# Patient Record
Sex: FEMALE | Race: WHITE | URBAN - METROPOLITAN AREA
[De-identification: names, ages, dates, MRNs, and addresses within clinical notes are randomized per-mention and may not be internally consistent; named-entity substitution may affect disease eponyms.]

---

## 2024-08-22 DIAGNOSIS — T14.8XXA OTHER INJURY OF UNSPECIFIED BODY REGION, INITIAL ENCOUNTER: Primary | ICD-10-CM

## 2024-08-22 DIAGNOSIS — M54.50 LUMBAGO: ICD-10-CM

## 2024-08-22 DIAGNOSIS — I89.0 LYMPHEDEMA: ICD-10-CM

## 2024-08-22 DIAGNOSIS — R26.9 ABNORMALITY OF GAIT: ICD-10-CM

## 2024-09-11 NOTE — PROGRESS NOTES
"[facility]  Outpatient Occupational Therapy  Functional Mobility and Wheelchair Assessment    Today's Date: 9/12/2024   Name: Katharine Gutierrez "Malina"  MRN [id number]    Therapy Diagnosis:   Encounter Diagnosis   Name Primary?    Impaired mobility and ADLs Yes     Physician: Luis Marie Jr., MD    Physician Orders: Wheelchair Evaluation  Comment: "Needing Electric Wheelchair"   Medical Diagnosis from Referral: M54.50 (ICD-10-CM) - Lumbago I89.0 (ICD-10-CM) - Lymphedema R26.9 (ICD-10-CM) - Abnormality of gait  Evaluation Date: 9/12/2024  Authorization Period Expiration: 8/22/2025  Plan of Care Expiration:  Evaluation Only  Visit # / Visits authorized: 1 / 1    Time In: 1:50  Time Out: 3:10  Total Billable Time: 60 minutes    Precautions: Standard and Fall    Subjective   Date of onset: ~2017  History of current condition - Malina was referred by Dr. Marie for a functional mobility and custom wheelchair assessment due to impaired mobility.  Pt reported a decline in her mobility and increased lymphedema following knee replacement in 2017. At this time, pt is taking 2-3 steps only with assistance. Currently has barriatric transport chair she received in 2020. She is using the wheelchair within the home.     Prior Therapy: outpatient PT 3x/w following knee replacement     Medical History:   No past medical history on file.    Surgical History:   Katharine Gutierrez  has no past surgical history on file.    Medications:   Katharine currently has no medications in their medication list.    Allergies:   Review of patient's allergies indicates:  Not on File     Patient height: 5'0''  Patient weight: ~310 (last weighted 2 years ago)  Is this a progressive disease? No  Any recent weight changes or trends? Yes - varies with lymphedema   Relevant future surgeries: No  Cardio status: intact  Respiratory status: intact   Does this patient have an amputation: No     HOME ENVIRONMENT  Living environment: Pt lives with " spouse and adult daughter. [facility] with threshold to enter.   Hours without assistance: 1-2 hours at times   Home is accessible to patient: portions of the home - is looking into further accommodations being made. 30 inch doors at this time.   Storage of wheelchair: in home , extra bedroom     DME: rolling walker, transport wheelchair; bidet   Owns but does not use: cane    COMMUNITY  Transportation: van with mobility car to assist patient   Does patient sit in wheelchair during transport? No   Self-?  No  Employment and/or School - specific requirements related to mobility needs: no    COMMUNICATION   Verbal communication: Understandable  Language - primary:  English  Language - secondary: n/a   Communication provided by patient and spouse     CURRENT SEATING / MOBILITY:   Current Mobility Device: manual wheelchair - transport (Bariatric)  Age of current device (years): ~4 years  Purchased by out of pocket   Current condition of mobility base:  fair; poor for pt's size  Current seating system:  not proper     Prior Level of Function: active prior to 2017 with ongoing decline following.   Current Level of Function: Pt reported at this time, she has limited shoulder range of motion and shoulder pain which impacts her transfers and safety. Moreover, pt demo lymphedema in BLE with knee pain and overal discomfort. Pt is primarily chair and bed bound. Spouse assist her with all care. She completed baths at chair/bed level.     Pain:  Average 6/10, worst 10/10, best 3/10   Location: all major joints   Pain medication    Pt's goals: Obtain power wheelchair for independent mobility in home and community.   Caregiver goals and specific limitations that may affect care: none noted      See face-sheet for patient contact and insurance information       Objective     MOBILITY / BALANCE  Sitting Balance Standing Balance Transfers Ambulation   Good: Patient able to maintain balance without handhold support, limited postural  sway. Poor: Patient requires handhold support and moderate to maximal assistance to maintain position minimal assist for stand pivot with 2 steps with rolling walker  Not for functional mobility; able to complete 2-3 steps only at this time    Fall History:   Number of falls in last 6 months: 0  Number of near falls in last 6 months: 0 Transfer method: stand pivot with rolling walker and 1 person for safety      Ability to complete Mobility-Related Activities of Daily Living (MRADL's) with CURRENT Mobility Device    Move room to room maximal assist in wheelchair- spouse to assist in transport chair  MRADL's Comments: Pt requiring total care from family at this time    Meal preparation Dependent on spouse/daughter at this time     Feeding Independent with added time/modification as needed for range     Bathing maximal assist at chair/bed level     Grooming moderate assist    Upper Extremity Dressing moderate assist    Lower Extremity Dressing total assist    Toileting maximal assist for transfers     Bowel Management: continent   Bladder Management: continent     Current Functional Mobility Equipment Skills     Cane / Crutches Not applicable   Walker / Rollator Not applicable   Manual Wheelchair - Does not meet mobility needs due to: inability to safely self propel    Manual Wheelchair with Power Assist () Unable to utilize   Scooter Unable to utilize    Power Wheelchair: standard joystick Meets needs for safe Independent functional ambulation / mobility   Power Wheelchair: alternative controls Unable to utilize    Summary: least costly alternative for independent functional mobility was found to be: power wheelchair     Functional Processing Skills for Wheeled Mobility        Processing skills are adequate for safe mobility: Yes  Patient is willing and motivated to use recommended mobility equipment: Yes  Patient is UNABLE to safely operate mobility equipment independently and requires dependent care  mobility: No       PATIENT MEASUREMENTS (inches)    1 29    26 to occiput seat to top of head    2 Right: 21  Left: 20.5 seat to shoulder left and right    3 Right: 15  Left: 14 seat to axilla left and right    4 Right: 9  Left: 8 seat to 90 degree elbow left and right    5 Right: 20.5:   Left: 20   seat depth    6 Right: 11.5  Left: 11.5 foot length left and right    7 6.5 head width    8 21 shoulder width    9 18 chest width    10 23 hip width    11 16 floor to seat left    12 16 floor to seat right   Comments: completed by [employer] while pt seated edge of mat         SENSATION and SKIN ISSUES    Sensation: impaired/Dulled Location(s) of sensation impairment: thighs/legs    Pressure Relief Methods: lean side to side to offload (without risk of falling) Effective pressure relief method(s) above can be performed consistently throughout the day: No , difficult to shift weight in current seated device and with less strength in bilateral upper extremity  to complete      Skin Issues/Skin Integrity - Current skin issues:  red area and open area back of thighs and buttock          History of skin issues:   Yes - skin break down and wounds for backside and BLEs   History of skin flap surgeries:   No   PAIN  Yes, see above with pain    How does pain interfere with mobility and/or MRADLs? yes       Gerber Scale for Predicting Pressure Sore Risk   Daisy Mayes & Coco Altamirano, Copyrighted 1988.  Risk Factor Score / Description   Sensory Perception; ability to respond meaningfully to pressure-related discomfort 3. Slightly Limited; Responds to verbal commands but cannot always communicate discomfort or need to be turned, OR has some sensory impairment which limits ability to feel pain or discomfort in 1 or 2 extremities.    Moisture; degree to which skin is exposed to moisture 2. Often Moist; skin is often but not always moist. Linen must be changed at least once a shift.    Activity; degree of physical activity 2. Chairfast;  ability to walk severely limited or nonexistent. Cannot bear own weight and/or must be assisted into chair or wheelchair.    Mobility; ability to change and control body position 2. Very Limited; makes occasional slight changes in body or extremity position but unable to make frequent or significant changes independently.    Nutrition; Usual food intake pattern  1NPO: nothing by mouth  2IV: Intravenously  3TPN: total parenteral nutrition 3. Adequate; Eats over half of most meals. Eats a total of 4 servings of protien (meat, dairy products) each day. Occasionally refuses a meal but will usually take a supplement if offered, OR is on a tube feeding or TPN3 regimen, which probably meets most of nutritional needs   Friction and Shear 1. Problem; requires moderate to maximum assistance in moving. Complete lifting without sliding against sheets is impossible. Frequently slides down in bed or chair, requiring frequent repositioning with maximum assistance. Spasticity, contractures or agitation leads to almost constant friction.    Total: 13  Very High Risk 6-9  High Risk 10-12  Moderate Risk 13-14  Mild Risk 15-18  No Risk 19-23         MAT EVALUATION    Neuro-Muscular Status (tone, reflexive, responses, etc.): Intact normal; lower tone and lymphedema in BLE      Pelvis     posterior; flexible - self correction       WFL       WFL      Tonal Influence at Pelvis: Normal   Trunk     WFL       WFL        neutral; slight elevation on right        Tonal Influence at Trunk: Normal   Head & Neck functional Good head control Tone/Movement of head and neck comments: n/a      Hips     abducted; flexible - external correction - due to lymphedema         neutral   Tone/Movement of lower extremities:  Normal  Edema: lymphedema   Location: yes         Lower Extremity Passive Range of Motion     Range of motion   Hip Flexion limited as follows: right side limitations due to weight of leg      Hip Extension limited as follows: limitations  due to weight of leg    Hip Abduction limited as follows: limitations due to weight of leg    Ankle Dorsiflexion limited as follows: due to severe edema    Ankle Plantarflexion        Lower Extremity Strength  Right lower extremity   Left lower extremity    Hip flexion: 3+/5 Hip flexion: 4/5   Hip extension:  3+/5 Hip extension: 3+/5   Hip abduction: 3+/5 Hip abduction: 3+/5   Hip adduction: 3+/5 Hip adduction 3+/5   Knee extension: 3+/5 Knee extension: 4/5   Knee flexion: 4-/5 Knee flexion: 3+/5   Ankle dorsiflexion: 3+/5 Ankle dorsiflexion: 3+/5   Ankle plantarflexion: 3+/5 Ankle plantarflexion: 3+/5         Upper Extremity Shoulder Posture:  Rounded shoulders    Tone/Movement of upper extremities:   Normal    Edema: mild-moderate for bilateral upper extremities         Wrist & Hand Handedness: right   Hand Limitations:  N/a       Upper Extremity Range of Motion     Range of Motion   Shoulder Flexion limited as follows: 80 *   Shoulder Abduction limited as follows: 45 *   Shoulder Adduction  limited as follows: 50*   Elbow Flexion WFLs   Elbow Extension WFLs   Wrist Flexion   WFLs   Wrist Extension WFLs    Strength Right: 30.8  lbs  Left: 26.4 lbs      Upper Extremity Strength  Right upper extremity   Left upper extremity     Shoulder flexion: 3-/5 Shoulder flexion: 3+/5   Shoulder Abduction: 3-/5 Shoulder Abduction: 3+/5   Elbow flexion: 4/5 Elbow flexion: 4/5   Elbow extension: 4/5 Elbow extension: 4/5   Wrist flexion: 4/5 Wrist flexion: 4/5   Wrist extension: 4/5 Wrist extension: 4/5       Mobility Base Recommendations and Justification    Mobility Base Recommendation: Power mobility base  Justification for decision: is not a safe, functional ambulator and equipment is now a lifetime medical need  Number of Hours per day in above selected mobility base: 8+    Component Recommendations and Justification  Should include but not be limited to:   POWER MOBILITY    [] Scooter/POV  [] can safely operate   [] can  safely transfer   [] has adequate trunk stability   [] functionally propel manual wheelchair    [x] Power mobility base  [x] non-ambulatory   [x] cannot functionally propel manual wheelchair   [x] cannot functionally and safely operate scooter/POV   [x] can safely operate power wheelchair   [] home is accessible   [] willing to use power wheelchair    Tilt   [x] Powered tilt on powered chair   [] Powered tilt on manual chair   [] Manual tilt on manual chair   Comments:  [x] change position for pressure relief/cannot weight shift  [x] change position against gravitational force on head and shoulders   [x] decrease pain   [] blood pressure management   [] control autonomic dysreflexia   [] decrease respiratory distress   [] management of spasticity   [] management of low tone   [] facilitate postural control   [x] rest periods   [x] control edema   [x] increase sitting tolerance   [x] aid with transfers    Recline   [] Power recline on power chair   [] Manual recline on manual chair   Comments:  [] intermittent catheterization   [] manage spasticity   [] accommodate femur to back angle   [] change position for pressure relief/cannot weight shift   [] high risk of pressure sore development   [] tilt alone does not accomplish effective pressure relief   [] difficult to transfer to and from bed  [] rest periods and sleeping in chair   [] repositioning for transfers   [] bring to full recline for ADL care   [] clothing/diaper changes in chair   [] gravity PEG tube feeding   [] head positioning   [] decrease pain   [] blood pressure management   [] control autonomic dysreflexia   [] decrease respiratory distress   [] user on ventilator    Elevator on mobility base   [] Power wheelchair   [] Scooter  [] increase Indep in transfers   [] increase Indep in ADLs   [] bathroom function and safety   [] kitchen/cooking function and safety   [] shopping   [] raise height for communication at standing level   [] raise height for eye  contact which reduces cervical neck strain and pain   [] drive at raised height for safety and navigating crowds    [] Vertical position system (anterior tilt) (Drive locks-out)   [] Stand (Drive enabled)  [] independent weight bearing   [] decrease joint contractures   [] decrease/manage spasticity   [] decrease/manage spasms   [] pressure distribution away from scapula, sacrum, coccyx, and ischial tuberosity   [] increase digestion and elimination   [] access to counters and cabinets   [] increase reach   [] increase interaction with others at eye level, reduces neck strain   [] increase performance of MRADL(s)    Power elevating legrest   [x] Center mount (Single)  degrees  [] Standard (Pair) 100-170 degrees [] position legs at 90 degrees, not available with std power ELR   [] center mount tucks into chair to decrease turning radius in home, not available with std power ELR   [x] provide change in position for LE   [x] elevate legs during recline   [x] maintain placement of feet on footplate  [x] decrease edema   [x] improve circulation   [] actuator needed to elevate legrest   [] actuator needed to articulate legrest preventing knees from flexing   [] Increase ground clearance over curbs  [] STD (pair) independently elevate legrest   POWER WHEELCHAIR CONTROLS    Controls/input device   [x] Right Hand  [] Left Hand  [] Expandable   [] Non-expandable   [x] Proportional   [] Non-proportional/switches/ head-array   [] Electrical/proximity  [] Mechanical    [x] provides access for controlling wheelchair   [] programming for accurate control   [] progressive disease/changing condition   [] required for alternative drive controls   [] lacks motor control to operate proportional drive control   [] unable to understand proportional controls   [] limited movement/strength   [] extraneous movement / tremors / ataxic / spastic   [x] Upgraded electronics controller/harness   [x] Single power (tilt or recline)   []  Expandable   [] Non-expandable plus   [] Multi-power (tilt, recline, power legrest, power seat lift, vertical positioning system, stand)  [x] allows input device to communicate with drive motors   [] harness provides necessary connections between the controller, input device, and seat functions   [] needed in order to operate power seat functions through joystick/ input device   [] required for alternative drive controls    [] Enhanced display [] required to connect all alternative drive controls   [] required for upgraded joystick (lite-throw, heavy duty, micro)  [] Allows user to see in which mode and drive the wheelchair is set; necessary for alternate controls    [] Upgraded tracking electronics [] correct tracking when on uneven surfaces   [] makes switch driving more efficient and less fatiguing   [] increase safety when driving  [] increase ability to traverse thresholds    [] Safety / reset / mode switches   Type:  [] Used to change modes and stop the wheelchair when driving    [x] Mount for joystick / input device/switches  [x] swing away for access or transfers  [] attaches joystick / input device / switches to wheelchair  [] provides for consistent access  [] midline for optimal placement   [] Attendant controlled joystick plus mount  [] safety   [] long distance driving   [] operation of seat functions   [] compliance with transportation regulations    [x] Battery  [x] required to power (power assist / scooter/ power wc / other):    [] Power inverter (24V to 12V) [] required for ventilator / respiratory equipment / other:       CHAIR OPTIONS MANUAL & POWER   Armrests   [x] adjustable height [] removable [] swing away[] fixed   [x] flip back [] reclining   [] full length pads [] desk [] tube arms   [] gel pads  [x] provide support with elbow at 90  [x] remove/flip back/swing away for transfers   [x] provide support and positioning of upper body   [x] allow to come closer to table top   [x] remove for  access to tables   [x] provide support for w/c tray   [] change of height/angles for variable activities   [] Elbow support / Elbow stop  [] keep elbow positioned on arm pad   [] keep arms from falling off arm pad during tilt and/or recline    Upper Extremity Support   [] Arm trough    [] Right [] Left [] Bilateral  Style:   [] swivel mount [] fixed mount   [] posterior hand support   [] ½ tray   [] full tray -joystick cut out  [] Right [] Left [] Bilateral  Style:  [] decrease gravitational pull on shoulders   [] provide support to increase UE function   [] provide hand support in natural position   [] position flaccid UE   [] decrease subluxation   [] decrease edema   [] manage spasticity   [] provide midline positioning   [] provide work surface   [] placement for AAC/Computer/EADL   Hangers/ Legrests   [x] 90 degree   [x] elevating   [x] articulating   [] swing away   [] fixed   [] lift off   [] heavy duty   [] adjustable knee angle   [x] adjustable calf panel   [] longer extension tube  [x] provide LE support   [x] maintain placement of feet on footplate  [x] accommodate lower leg length   [] accommodate to hamstring tightness   [x] enable transfers   [] provide change in position for LE's   [] elevate legs during recline   [x] decrease edema   [] durability    Foot support   [x] footplate   [] Right [] Left [x] Bilateral  [] flip up  [] depth adjustable   [x] angle adjustable  [] foot board/one piece   Width extension kit  [x]  [x] provide foot support   [x] accommodate to ankle ROM   [x] allow foot to go under wheelchair base   [x] enable transfers    [] Shoe holders  [] position foot   [] decrease / manage spasticity   [] control position of LE   [] stability   [] safety    [] Ankle strap/heel loops  [] support foot on foot support   [] decrease extraneous movement   [] provide input to heel   [] protect foot   [] Amputee adapter   [] Right [] Left [] Bilateral  Style: ___ Size: ___  [] Provide support for  stump/residual extremity   [] Transportation tie-down [] to provide crash tested tie-down brackets    [] Crutch/cane moran   [] O2 moran   [] IV   [] Ventilator tray/mount  [] stabilize accessory on wheelchair   [x] Seat cushion  [] accommodate impaired sensation   [x] decubitus ulcers present or history   [] unable to shift weight   [x] increase pressure distribution   [] prevent pelvic extension   [] stabilize/promote pelvis alignment   [] stabilize/promote femur alignment   [] accommodate obliquity   [] accommodate multiple deformity   [] incontinent/accidents   [x] low maintenance   [x] seat richard   [x] fixed   [] removable [x] attach seat platform/cushion to wheelchair frame   [] Seat wedge  [] provide increased aggressiveness of seat shape to decrease sliding down in the seat   [] accommodate ROM    [x] Cover replacement  [x] protect back or seat cushion   [] incontinent/accidents   [] Solid seat / insert  [] support cushion to prevent hammocking   [] allows attachment of cushion to mobility base    [] Lateral pelvic/thigh/hip support (Guides)  [] decrease abduction   [] accommodate pelvis   [] position upper legs   [] accommodate spasticity   [] removable for transfers     [] Lateral pelvic/thigh supports richard  [] fixed   [] swing-away   [] removable   [] richard lateral pelvic/thigh supports   [] richard lateral pelvic/thigh supports swing-away or removable for transfers   [] Medial thigh support (Pommel)  [] decrease adduction   [] accommodate ROM   [] alignment  [] remove for transfers     [] Medial thigh support richard   [] fixed   [] swing-away  [] removable   [] richard medial thigh supports  [] richard medial supports swing- away or removable for transfers   [x] Back  [x] provide posterior trunk support   [x] provide lumbar/sacral support   [x] support trunk in midline  [] provide lateral trunk support   [] accommodate or decrease tone   [] facilitate tone   [] accommodate deformity   [] custom  required off-the-shelf back support will not accommodate deformity    [x] Back richard  [x] fixed   [] removable [x] attach back rest/cushion to wheelchair frame   [] Lateral trunk supports    [] Right [] Left [] Bilateral [] decrease lateral trunk leaning   [] accommodate asymmetry   [] contour for increased contact   [] safety   [] control of tone    [] Lateral trunk supports richard  [] fixed   [] swing-away   [] removable [] richard lateral trunk supports   [] richard lateral trunk supports swing away or removable for transfers   [] Anterior chest strap, vest  [] decrease forward movement of shoulder   [] decrease forward movement of trunk   [] safety/stability   [] added abdominal support   [] trunk alignment   [] assistance with shoulder control   [] decrease shoulder elevation    [] Headrest  [] provide posterior head support   [] provide posterior neck support   [] provide lateral head support   [] provide anterior head support   [] support during tilt and recline   [] improve feeding   [] improve respiration   [] placement of switches   [] safety   [] accommodate ROM   [] accommodate tone   [] improve visual orientation    [] Headrest mounting hardware  [] fixed   [] removable   [] flip down   [] swing-away laterals/switches [] mount headrest   [] richard headrest flip down or removable for transfers  [] mount headrest swing-away laterals   [] mount switches    [] Neck Support  [] decrease neck rotation   [] decrease forward neck flexion    [x] Pelvic Positioner   [x] std hip belt   [] padded hip belt   [] dual pull hip belt   [] four point hip belt  [] stabilize tone   [x] decrease falling out of chair   [] prevent excessive extension   [] special pull angle to control rotation   [] pad for protection over lan prominence   [] promote comfort    [] Essential needs bag/pouch  [] medicines [] special food [] orthotics [] clothing changes   [] diapers [] catheter/hygiene [] ostomy supplies               The  above equipment has a life- long use expectancy. Growth and changes in medical and/or functional conditions would be the exceptions.      *This functional mobility and wheelchair assessment form has been adapted with permission from [employer].     TREATMENT     Malina participated in dynamic functional therapeutic activities to improve functional performance for 11  minutes, including:  -edu on HH OT and need for assistance for continued safety of patient and spouse with transfers and ADLs  -added time spent on wheelchair (power) needs for transportation and increased activity in the community      Assessment  Why mobility device was selected; include why a lower level device is not appropriate:   Katharine is a 74 y.o. female referred to outpatient Occupational Therapy with a medical diagnosis of Lumbago & Lymphedema for custom power wheelchair evaluation for increased safety and participation in daily roles and routines. At this time, pt is wheelchair and bed bound for participation in roles and routines. Having a power chair will increase her functional participation in daily roles and routines and decreased caregiver's strain with propelling patient. Moreover, patient would benefit from home health therapy for transfer training and safety with spouse, edu on wheelchair use and ADL training.     Patient has mobility limitation that significantly impairs safe, timely, consistent participation in one or more MRADL. Yes  A mobility assistive device will effectively improve ability to participate in or aid participation in MRADL's.  Yes   A cane or walker will provide patient the ability to safely and consistently perform MRADLs in a timely manner  No  The patient's home environment will support use of recommended mobility device. Yes  The patient has sufficient UE strength to effectively self propel a manual wheelchair for all MRADL's. No  The patient has sufficient upper extremity strength, , transfer  ability and trunk stability to safely operate a POV (scooter). No  The additional benefits of a power wheelchair will more effectively enable MRADL's in home. Yes   The patient demonstrates ability/potential ability to use recommended equipment. Yes  The patient is willing and motivated to use the recommended equipment. Yes    Pt prognosis is Fair.   Pt will benefit from skilled home health Occupational Therapy to address the deficits stated above and in the chart below, provide pt/family education, and to maximize pt's level of independence.     Plan of care discussed with patient: Yes  Pt's spiritual, cultural and educational needs considered and patient is agreeable to the plan of care and goals as stated below:     OT Medical Necessity is demonstrated by the following:    Profile and History Assessment of Occupational Performance Level of Clinical Decision Making Complexity Score   Occupational Profile:   Katharine Gutierrez is a 74 y.o. female who lives with their family. Katharine Gutierrez has difficulty with  bathing, grooming, and dressing  driving/transportation management, shopping, and housework/household chores  affecting his/her daily functional abilities. His/her main goal for therapy is to obtain a custom power chair.     Comorbidities:   See chart    Medical and Therapy History Review:   Expanded               Performance Deficits    Physical:  Joint Mobility  Joint Stability  Muscle Power/Strength  Muscle Endurance  Edema   Strength  Pinch Strength  Postural Control  Pain    Cognitive:  No Deficits    Psychosocial:    Routines  Social interactions  Community interactions     Clinical Decision Making:  moderate    Assessment Process:  Comprehensive Assessments    Modification/Need for Assistance:  Minimal-Moderate Modifications/Assistance    Intervention Selection:  Several Treatment Options       moderate  Based on PMHX, co morbidities , data from assessments and functional level of assistance required  with task and clinical presentation directly impacting function.         Goals:  No goals set; evaluation only     Plan of Care Certification    Wheelchair evaluation only     PETEY Guzmán  9/12/2024      As the evaluating therapist, I hereby attest that I have personally completed this evaluation and that I am not an employee of or working under contract to the (s) or the provider(s) of the durable medical equipment recommended in my evaluation. I further attest that I have not and will not receive remuneration of any kind from the (s) or the durable medical equipment provider(s) for the equipment I have recommended with this evaluation.     The following ATP was present and participated in this evaluation:   Luis Abdul, ATP from  Wheelchair vendor.

## 2024-09-12 ENCOUNTER — CLINICAL SUPPORT (OUTPATIENT)
Dept: REHABILITATION | Facility: HOSPITAL | Age: 74
End: 2024-09-12
Payer: MEDICARE

## 2024-09-12 DIAGNOSIS — Z74.09 IMPAIRED MOBILITY AND ADLS: Primary | ICD-10-CM

## 2024-09-12 DIAGNOSIS — Z78.9 IMPAIRED MOBILITY AND ADLS: Primary | ICD-10-CM

## 2024-09-12 PROCEDURE — 97166 OT EVAL MOD COMPLEX 45 MIN: CPT | Mod: PO

## 2024-09-12 PROCEDURE — 97530 THERAPEUTIC ACTIVITIES: CPT | Mod: PO

## 2024-09-12 NOTE — PLAN OF CARE
"[facility]  Outpatient Occupational Therapy  Functional Mobility and Wheelchair Assessment    Today's Date: 9/12/2024   Name: Katharine Gutierrez "Malina"  MRN [id number]    Therapy Diagnosis:   Encounter Diagnosis   Name Primary?    Impaired mobility and ADLs Yes     Physician: Luis Marie Jr., MD    Physician Orders: Wheelchair Evaluation  Comment: "Needing Electric Wheelchair"   Medical Diagnosis from Referral: M54.50 (ICD-10-CM) - Lumbago I89.0 (ICD-10-CM) - Lymphedema R26.9 (ICD-10-CM) - Abnormality of gait  Evaluation Date: 9/12/2024  Authorization Period Expiration: 8/22/2025  Plan of Care Expiration:  Evaluation Only  Visit # / Visits authorized: 1 / 1    Time In: 1:50  Time Out: 3:10  Total Billable Time: 60 minutes    Precautions: Standard and Fall    Subjective   Date of onset: ~2017  History of current condition - Malina was referred by Dr. Marie for a functional mobility and custom wheelchair assessment due to impaired mobility.  Pt reported a decline in her mobility and increased lymphedema following knee replacement in 2017. At this time, pt is taking 2-3 steps only with assistance. Currently has barriatric transport chair she received in 2020. She is using the wheelchair within the home.     Prior Therapy: outpatient PT 3x/w following knee replacement     Medical History:   No past medical history on file.    Surgical History:   Katharine Gutierrez  has no past surgical history on file.    Medications:   Katharine currently has no medications in their medication list.    Allergies:   Review of patient's allergies indicates:  Not on File     Patient height: 5'0''  Patient weight: ~310 (last weighted 2 years ago)  Is this a progressive disease? No  Any recent weight changes or trends? Yes - varies with lymphedema   Relevant future surgeries: No  Cardio status: intact  Respiratory status: intact   Does this patient have an amputation: No     HOME ENVIRONMENT  Living environment: Pt lives with " spouse and adult daughter. [facility] with threshold to enter.   Hours without assistance: 1-2 hours at times   Home is accessible to patient: portions of the home - is looking into further accommodations being made. 30 inch doors at this time.   Storage of wheelchair: in home , extra bedroom     DME: rolling walker, transport wheelchair; bidet   Owns but does not use: cane    COMMUNITY  Transportation: van with mobility car to assist patient   Does patient sit in wheelchair during transport? No   Self-?  No  Employment and/or School - specific requirements related to mobility needs: no    COMMUNICATION   Verbal communication: Understandable  Language - primary:  English  Language - secondary: n/a   Communication provided by patient and spouse     CURRENT SEATING / MOBILITY:   Current Mobility Device: manual wheelchair - transport (Bariatric)  Age of current device (years): ~4 years  Purchased by out of pocket   Current condition of mobility base: fair; poor for pt's size  Current seating system: not proper     Prior Level of Function: active prior to 2017 with ongoing decline following.   Current Level of Function: Pt reported at this time, she has limited shoulder range of motion and shoulder pain which impacts her transfers and safety. Moreover, pt demo lymphedema in BLE with knee pain and overal discomfort. Pt is primarily chair and bed bound. Spouse assist her with all care. She completed baths at chair/bed level.     Pain:  Average 6/10, worst 10/10, best 3/10   Location: all major joints   Pain medication    Pt's goals: Obtain power wheelchair for independent mobility in home and community.   Caregiver goals and specific limitations that may affect care: none noted      See face-sheet for patient contact and insurance information       Objective     MOBILITY / BALANCE  Sitting Balance Standing Balance Transfers Ambulation   Good: Patient able to maintain balance without handhold support, limited postural sway.  Poor: Patient requires handhold support and moderate to maximal assistance to maintain position minimal assist for stand pivot with 2 steps with rolling walker  Not for functional mobility; able to complete 2-3 steps only at this time    Fall History:   Number of falls in last 6 months: 0  Number of near falls in last 6 months: 0 Transfer method: stand pivot with rolling walker and 1 person for safety      Ability to complete Mobility-Related Activities of Daily Living (MRADL's) with CURRENT Mobility Device    Move room to room maximal assist in wheelchair- spouse to assist in transport chair  MRADL's Comments: Pt requiring total care from family at this time    Meal preparation Dependent on spouse/daughter at this time     Feeding Independent with added time/modification as needed for range     Bathing maximal assist at chair/bed level     Grooming moderate assist    Upper Extremity Dressing moderate assist    Lower Extremity Dressing total assist    Toileting maximal assist for transfers     Bowel Management: continent   Bladder Management: continent     Current Functional Mobility Equipment Skills     Cane / Crutches Not applicable   Walker / Rollator Not applicable   Manual Wheelchair - Does not meet mobility needs due to: inability to safely self propel    Manual Wheelchair with Power Assist () Unable to utilize   Scooter Unable to utilize    Power Wheelchair: standard joystick Meets needs for safe Independent functional ambulation / mobility   Power Wheelchair: alternative controls Unable to utilize    Summary: least costly alternative for independent functional mobility was found to be: power wheelchair     Functional Processing Skills for Wheeled Mobility        Processing skills are adequate for safe mobility: Yes  Patient is willing and motivated to use recommended mobility equipment: Yes  Patient is UNABLE to safely operate mobility equipment independently and requires dependent care  mobility: No       PATIENT MEASUREMENTS (inches)    1 29    26 to occiput seat to top of head    2 Right: 21  Left: 20.5 seat to shoulder left and right    3 Right: 15  Left: 14 seat to axilla left and right    4 Right: 9  Left: 8 seat to 90 degree elbow left and right    5 Right: 20.5:   Left: 20   seat depth    6 Right: 11.5  Left: 11.5 foot length left and right    7 6.5 head width    8 21 shoulder width    9 18 chest width    10 23 hip width    11 16 floor to seat left    12 16 floor to seat right   Comments: completed by [employer] while pt seated edge of mat         SENSATION and SKIN ISSUES    Sensation: impaired/Dulled Location(s) of sensation impairment: thighs/legs    Pressure Relief Methods: lean side to side to offload (without risk of falling) Effective pressure relief method(s) above can be performed consistently throughout the day: No , difficult to shift weight in current seated device and with less strength in bilateral upper extremity  to complete      Skin Issues/Skin Integrity - Current skin issues:  red area and open area back of thighs and buttock          History of skin issues:   Yes - skin break down and wounds for backside and BLEs   History of skin flap surgeries:   No   PAIN  Yes, see above with pain    How does pain interfere with mobility and/or MRADLs? yes       Gerber Scale for Predicting Pressure Sore Risk   Daisy Mayes & Coco Altamirano, Copyrighted 1988.  Risk Factor Score / Description   Sensory Perception; ability to respond meaningfully to pressure-related discomfort 3. Slightly Limited; Responds to verbal commands but cannot always communicate discomfort or need to be turned, OR has some sensory impairment which limits ability to feel pain or discomfort in 1 or 2 extremities.    Moisture; degree to which skin is exposed to moisture 2. Often Moist; skin is often but not always moist. Linen must be changed at least once a shift.    Activity; degree of physical activity 2. Chairfast;  ability to walk severely limited or nonexistent. Cannot bear own weight and/or must be assisted into chair or wheelchair.    Mobility; ability to change and control body position 2. Very Limited; makes occasional slight changes in body or extremity position but unable to make frequent or significant changes independently.    Nutrition; Usual food intake pattern  1NPO: nothing by mouth  2IV: Intravenously  3TPN: total parenteral nutrition 3. Adequate; Eats over half of most meals. Eats a total of 4 servings of protien (meat, dairy products) each day. Occasionally refuses a meal but will usually take a supplement if offered, OR is on a tube feeding or TPN3 regimen, which probably meets most of nutritional needs   Friction and Shear 1. Problem; requires moderate to maximum assistance in moving. Complete lifting without sliding against sheets is impossible. Frequently slides down in bed or chair, requiring frequent repositioning with maximum assistance. Spasticity, contractures or agitation leads to almost constant friction.    Total: 13  Very High Risk 6-9  High Risk 10-12  Moderate Risk 13-14  Mild Risk 15-18  No Risk 19-23         MAT EVALUATION    Neuro-Muscular Status (tone, reflexive, responses, etc.): Intact normal; lower tone and lymphedema in BLE      Pelvis     posterior; flexible - self correction       WFL       WFL      Tonal Influence at Pelvis: Normal   Trunk     WFL       WFL        neutral; slight elevation on right        Tonal Influence at Trunk: Normal   Head & Neck functional Good head control Tone/Movement of head and neck comments: n/a      Hips     abducted; flexible - external correction - due to lymphedema         neutral   Tone/Movement of lower extremities:  Normal  Edema: lymphedema   Location: yes         Lower Extremity Passive Range of Motion     Range of motion   Hip Flexion limited as follows: right side limitations due to weight of leg      Hip Extension limited as follows: limitations  due to weight of leg    Hip Abduction limited as follows: limitations due to weight of leg    Ankle Dorsiflexion limited as follows: due to severe edema    Ankle Plantarflexion        Lower Extremity Strength  Right lower extremity   Left lower extremity    Hip flexion: 3+/5 Hip flexion: 4/5   Hip extension:  3+/5 Hip extension: 3+/5   Hip abduction: 3+/5 Hip abduction: 3+/5   Hip adduction: 3+/5 Hip adduction 3+/5   Knee extension: 3+/5 Knee extension: 4/5   Knee flexion: 4-/5 Knee flexion: 3+/5   Ankle dorsiflexion: 3+/5 Ankle dorsiflexion: 3+/5   Ankle plantarflexion: 3+/5 Ankle plantarflexion: 3+/5         Upper Extremity Shoulder Posture:  Rounded shoulders    Tone/Movement of upper extremities:   Normal    Edema: mild-moderate for bilateral upper extremities         Wrist & Hand Handedness: right   Hand Limitations:  N/a       Upper Extremity Range of Motion     Range of Motion   Shoulder Flexion limited as follows: 80 *   Shoulder Abduction limited as follows: 45 *   Shoulder Adduction  limited as follows: 50*   Elbow Flexion WFLs   Elbow Extension WFLs   Wrist Flexion   WFLs   Wrist Extension WFLs    Strength Right: 30.8  lbs  Left: 26.4 lbs      Upper Extremity Strength  Right upper extremity   Left upper extremity     Shoulder flexion: 3-/5 Shoulder flexion: 3+/5   Shoulder Abduction: 3-/5 Shoulder Abduction: 3+/5   Elbow flexion: 4/5 Elbow flexion: 4/5   Elbow extension: 4/5 Elbow extension: 4/5   Wrist flexion: 4/5 Wrist flexion: 4/5   Wrist extension: 4/5 Wrist extension: 4/5       Mobility Base Recommendations and Justification    Mobility Base Recommendation: Power mobility base  Justification for decision: is not a safe, functional ambulator and equipment is now a lifetime medical need  Number of Hours per day in above selected mobility base: 8+    Component Recommendations and Justification  Should include but not be limited to:   POWER MOBILITY    [] Scooter/POV  [] can safely operate   [] can  safely transfer   [] has adequate trunk stability   [] functionally propel manual wheelchair    [x] Power mobility base  [x] non-ambulatory   [x] cannot functionally propel manual wheelchair   [x] cannot functionally and safely operate scooter/POV   [x] can safely operate power wheelchair   [] home is accessible   [] willing to use power wheelchair    Tilt   [x] Powered tilt on powered chair   [] Powered tilt on manual chair   [] Manual tilt on manual chair   Comments:  [x] change position for pressure relief/cannot weight shift  [x] change position against gravitational force on head and shoulders   [x] decrease pain   [] blood pressure management   [] control autonomic dysreflexia   [] decrease respiratory distress   [] management of spasticity   [] management of low tone   [] facilitate postural control   [x] rest periods   [x] control edema   [x] increase sitting tolerance   [x] aid with transfers    Recline   [] Power recline on power chair   [] Manual recline on manual chair   Comments:  [] intermittent catheterization   [] manage spasticity   [] accommodate femur to back angle   [] change position for pressure relief/cannot weight shift   [] high risk of pressure sore development   [] tilt alone does not accomplish effective pressure relief   [] difficult to transfer to and from bed  [] rest periods and sleeping in chair   [] repositioning for transfers   [] bring to full recline for ADL care   [] clothing/diaper changes in chair   [] gravity PEG tube feeding   [] head positioning   [] decrease pain   [] blood pressure management   [] control autonomic dysreflexia   [] decrease respiratory distress   [] user on ventilator    Elevator on mobility base   [] Power wheelchair   [] Scooter  [] increase Indep in transfers   [] increase Indep in ADLs   [] bathroom function and safety   [] kitchen/cooking function and safety   [] shopping   [] raise height for communication at standing level   [] raise height for eye  contact which reduces cervical neck strain and pain   [] drive at raised height for safety and navigating crowds    [] Vertical position system (anterior tilt) (Drive locks-out)   [] Stand (Drive enabled)  [] independent weight bearing   [] decrease joint contractures   [] decrease/manage spasticity   [] decrease/manage spasms   [] pressure distribution away from scapula, sacrum, coccyx, and ischial tuberosity   [] increase digestion and elimination   [] access to counters and cabinets   [] increase reach   [] increase interaction with others at eye level, reduces neck strain   [] increase performance of MRADL(s)    Power elevating legrest   [x] Center mount (Single)  degrees  [] Standard (Pair) 100-170 degrees [] position legs at 90 degrees, not available with std power ELR   [] center mount tucks into chair to decrease turning radius in home, not available with std power ELR   [x] provide change in position for LE   [x] elevate legs during recline   [x] maintain placement of feet on footplate  [x] decrease edema   [x] improve circulation   [] actuator needed to elevate legrest   [] actuator needed to articulate legrest preventing knees from flexing   [] Increase ground clearance over curbs  [] STD (pair) independently elevate legrest   POWER WHEELCHAIR CONTROLS    Controls/input device   [x] Right Hand  [] Left Hand  [] Expandable   [] Non-expandable   [x] Proportional   [] Non-proportional/switches/ head-array   [] Electrical/proximity  [] Mechanical    [x] provides access for controlling wheelchair   [] programming for accurate control   [] progressive disease/changing condition   [] required for alternative drive controls   [] lacks motor control to operate proportional drive control   [] unable to understand proportional controls   [] limited movement/strength   [] extraneous movement / tremors / ataxic / spastic   [x] Upgraded electronics controller/harness   [x] Single power (tilt or recline)   []  Expandable   [] Non-expandable plus   [] Multi-power (tilt, recline, power legrest, power seat lift, vertical positioning system, stand)  [x] allows input device to communicate with drive motors   [] harness provides necessary connections between the controller, input device, and seat functions   [] needed in order to operate power seat functions through joystick/ input device   [] required for alternative drive controls    [] Enhanced display [] required to connect all alternative drive controls   [] required for upgraded joystick (lite-throw, heavy duty, micro)  [] Allows user to see in which mode and drive the wheelchair is set; necessary for alternate controls    [] Upgraded tracking electronics [] correct tracking when on uneven surfaces   [] makes switch driving more efficient and less fatiguing   [] increase safety when driving  [] increase ability to traverse thresholds    [] Safety / reset / mode switches   Type:  [] Used to change modes and stop the wheelchair when driving    [x] Mount for joystick / input device/switches  [x] swing away for access or transfers  [] attaches joystick / input device / switches to wheelchair  [] provides for consistent access  [] midline for optimal placement   [] Attendant controlled joystick plus mount  [] safety   [] long distance driving   [] operation of seat functions   [] compliance with transportation regulations    [x] Battery  [x] required to power (power assist / scooter/ power wc / other):    [] Power inverter (24V to 12V) [] required for ventilator / respiratory equipment / other:       CHAIR OPTIONS MANUAL & POWER   Armrests   [x] adjustable height [] removable [] swing away[] fixed   [x] flip back [] reclining   [] full length pads [] desk [] tube arms   [] gel pads  [x] provide support with elbow at 90  [x] remove/flip back/swing away for transfers   [x] provide support and positioning of upper body   [x] allow to come closer to table top   [x] remove for  access to tables   [x] provide support for w/c tray   [] change of height/angles for variable activities   [] Elbow support / Elbow stop  [] keep elbow positioned on arm pad   [] keep arms from falling off arm pad during tilt and/or recline    Upper Extremity Support   [] Arm trough    [] Right [] Left [] Bilateral  Style:   [] swivel mount [] fixed mount   [] posterior hand support   [] ½ tray   [] full tray -joystick cut out  [] Right [] Left [] Bilateral  Style:  [] decrease gravitational pull on shoulders   [] provide support to increase UE function   [] provide hand support in natural position   [] position flaccid UE   [] decrease subluxation   [] decrease edema   [] manage spasticity   [] provide midline positioning   [] provide work surface   [] placement for AAC/Computer/EADL   Hangers/ Legrests   [x] 90 degree   [x] elevating   [x] articulating   [] swing away   [] fixed   [] lift off   [] heavy duty   [] adjustable knee angle   [x] adjustable calf panel   [] longer extension tube  [x] provide LE support   [x] maintain placement of feet on footplate  [x] accommodate lower leg length   [] accommodate to hamstring tightness   [x] enable transfers   [] provide change in position for LE's   [] elevate legs during recline   [x] decrease edema   [] durability    Foot support   [x] footplate   [] Right [] Left [x] Bilateral  [] flip up  [] depth adjustable   [x] angle adjustable  [] foot board/one piece   Width extension kit  [x]  [x] provide foot support   [x] accommodate to ankle ROM   [x] allow foot to go under wheelchair base   [x] enable transfers    [] Shoe holders  [] position foot   [] decrease / manage spasticity   [] control position of LE   [] stability   [] safety    [] Ankle strap/heel loops  [] support foot on foot support   [] decrease extraneous movement   [] provide input to heel   [] protect foot   [] Amputee adapter   [] Right [] Left [] Bilateral  Style: ___ Size: ___  [] Provide support for  stump/residual extremity   [] Transportation tie-down [] to provide crash tested tie-down brackets    [] Crutch/cane moran   [] O2 moran   [] IV   [] Ventilator tray/mount  [] stabilize accessory on wheelchair   [x] Seat cushion  [] accommodate impaired sensation   [x] decubitus ulcers present or history   [] unable to shift weight   [x] increase pressure distribution   [] prevent pelvic extension   [] stabilize/promote pelvis alignment   [] stabilize/promote femur alignment   [] accommodate obliquity   [] accommodate multiple deformity   [] incontinent/accidents   [x] low maintenance   [x] seat richard   [x] fixed   [] removable [x] attach seat platform/cushion to wheelchair frame   [] Seat wedge  [] provide increased aggressiveness of seat shape to decrease sliding down in the seat   [] accommodate ROM    [x] Cover replacement  [x] protect back or seat cushion   [] incontinent/accidents   [] Solid seat / insert  [] support cushion to prevent hammocking   [] allows attachment of cushion to mobility base    [] Lateral pelvic/thigh/hip support (Guides)  [] decrease abduction   [] accommodate pelvis   [] position upper legs   [] accommodate spasticity   [] removable for transfers     [] Lateral pelvic/thigh supports richard  [] fixed   [] swing-away   [] removable   [] richard lateral pelvic/thigh supports   [] richard lateral pelvic/thigh supports swing-away or removable for transfers   [] Medial thigh support (Pommel)  [] decrease adduction   [] accommodate ROM   [] alignment  [] remove for transfers     [] Medial thigh support richard   [] fixed   [] swing-away  [] removable   [] richard medial thigh supports  [] richard medial supports swing- away or removable for transfers   [x] Back  [x] provide posterior trunk support   [x] provide lumbar/sacral support   [x] support trunk in midline  [] provide lateral trunk support   [] accommodate or decrease tone   [] facilitate tone   [] accommodate deformity   [] custom  required off-the-shelf back support will not accommodate deformity    [x] Back richard  [x] fixed   [] removable [x] attach back rest/cushion to wheelchair frame   [] Lateral trunk supports    [] Right [] Left [] Bilateral [] decrease lateral trunk leaning   [] accommodate asymmetry   [] contour for increased contact   [] safety   [] control of tone    [] Lateral trunk supports richard  [] fixed   [] swing-away   [] removable [] richard lateral trunk supports   [] richard lateral trunk supports swing away or removable for transfers   [] Anterior chest strap, vest  [] decrease forward movement of shoulder   [] decrease forward movement of trunk   [] safety/stability   [] added abdominal support   [] trunk alignment   [] assistance with shoulder control   [] decrease shoulder elevation    [] Headrest  [] provide posterior head support   [] provide posterior neck support   [] provide lateral head support   [] provide anterior head support   [] support during tilt and recline   [] improve feeding   [] improve respiration   [] placement of switches   [] safety   [] accommodate ROM   [] accommodate tone   [] improve visual orientation    [] Headrest mounting hardware  [] fixed   [] removable   [] flip down   [] swing-away laterals/switches [] mount headrest   [] richard headrest flip down or removable for transfers  [] mount headrest swing-away laterals   [] mount switches    [] Neck Support  [] decrease neck rotation   [] decrease forward neck flexion    [x] Pelvic Positioner   [x] std hip belt   [] padded hip belt   [] dual pull hip belt   [] four point hip belt  [] stabilize tone   [x] decrease falling out of chair   [] prevent excessive extension   [] special pull angle to control rotation   [] pad for protection over lan prominence   [] promote comfort    [] Essential needs bag/pouch  [] medicines [] special food [] orthotics [] clothing changes   [] diapers [] catheter/hygiene [] ostomy supplies               The  above equipment has a life- long use expectancy. Growth and changes in medical and/or functional conditions would be the exceptions.      *This functional mobility and wheelchair assessment form has been adapted with permission from [employer].     TREATMENT     Malina participated in dynamic functional therapeutic activities to improve functional performance for 11  minutes, including:  -edu on HH OT and need for assistance for continued safety of patient and spouse with transfers and ADLs  -added time spent on wheelchair (power) needs for transportation and increased activity in the community      Assessment  Why mobility device was selected; include why a lower level device is not appropriate:   Katharine is a 74 y.o. female referred to outpatient Occupational Therapy with a medical diagnosis of Lumbago & Lymphedema for custom power wheelchair evaluation for increased safety and participation in daily roles and routines. At this time, pt is wheelchair and bed bound for participation in roles and routines. Having a power chair will increase her functional participation in daily roles and routines and decreased caregiver's strain with propelling patient. Moreover, patient would benefit from home health therapy for transfer training and safety with spouse, edu on wheelchair use and ADL training.     Patient has mobility limitation that significantly impairs safe, timely, consistent participation in one or more MRADL. Yes  A mobility assistive device will effectively improve ability to participate in or aid participation in MRADL's.  Yes   A cane or walker will provide patient the ability to safely and consistently perform MRADLs in a timely manner  No  The patient's home environment will support use of recommended mobility device. Yes  The patient has sufficient UE strength to effectively self propel a manual wheelchair for all MRADL's. No  The patient has sufficient upper extremity strength, , transfer  ability and trunk stability to safely operate a POV (scooter). No  The additional benefits of a power wheelchair will more effectively enable MRADL's in home. Yes   The patient demonstrates ability/potential ability to use recommended equipment. Yes  The patient is willing and motivated to use the recommended equipment. Yes    Pt prognosis is Fair.   Pt will benefit from skilled home health Occupational Therapy to address the deficits stated above and in the chart below, provide pt/family education, and to maximize pt's level of independence.     Plan of care discussed with patient: Yes  Pt's spiritual, cultural and educational needs considered and patient is agreeable to the plan of care and goals as stated below:     OT Medical Necessity is demonstrated by the following:    Profile and History Assessment of Occupational Performance Level of Clinical Decision Making Complexity Score   Occupational Profile:   Katharine Gutierrez is a 74 y.o. female who lives with their family. Katharine Gutierrez has difficulty with  bathing, grooming, and dressing  driving/transportation management, shopping, and housework/household chores  affecting his/her daily functional abilities. His/her main goal for therapy is to obtain a custom power chair.     Comorbidities:   See chart    Medical and Therapy History Review:   Expanded               Performance Deficits    Physical:  Joint Mobility  Joint Stability  Muscle Power/Strength  Muscle Endurance  Edema   Strength  Pinch Strength  Postural Control  Pain    Cognitive:  No Deficits    Psychosocial:    Routines  Social interactions  Community interactions     Clinical Decision Making:  moderate    Assessment Process:  Comprehensive Assessments    Modification/Need for Assistance:  Minimal-Moderate Modifications/Assistance    Intervention Selection:  Several Treatment Options       moderate  Based on PMHX, co morbidities , data from assessments and functional level of assistance required  with task and clinical presentation directly impacting function.         Goals:  No goals set; evaluation only     Plan of Care Certification    Wheelchair evaluation only     PETEY Guzmán  9/12/2024      As the evaluating therapist, I hereby attest that I have personally completed this evaluation and that I am not an employee of or working under contract to the (s) or the provider(s) of the durable medical equipment recommended in my evaluation. I further attest that I have not and will not receive remuneration of any kind from the (s) or the durable medical equipment provider(s) for the equipment I have recommended with this evaluation.     The following ATP was present and participated in this evaluation:   Luis Abdul, ATP from  Wheelchair vendor.